# Patient Record
Sex: FEMALE | Race: WHITE | NOT HISPANIC OR LATINO | Employment: UNEMPLOYED | URBAN - METROPOLITAN AREA
[De-identification: names, ages, dates, MRNs, and addresses within clinical notes are randomized per-mention and may not be internally consistent; named-entity substitution may affect disease eponyms.]

---

## 2020-01-22 ENCOUNTER — TRANSFERRED RECORDS (OUTPATIENT)
Dept: MULTI SPECIALTY CLINIC | Facility: CLINIC | Age: 45
End: 2020-01-22

## 2020-01-22 LAB
HPV ABSTRACT: NORMAL
PAP-ABSTRACT: NORMAL

## 2022-07-05 ENCOUNTER — OFFICE VISIT (OUTPATIENT)
Dept: FAMILY MEDICINE | Facility: OTHER | Age: 47
End: 2022-07-05
Attending: FAMILY MEDICINE
Payer: COMMERCIAL

## 2022-07-05 VITALS
SYSTOLIC BLOOD PRESSURE: 112 MMHG | DIASTOLIC BLOOD PRESSURE: 78 MMHG | OXYGEN SATURATION: 98 % | TEMPERATURE: 97.6 F | RESPIRATION RATE: 18 BRPM | WEIGHT: 199 LBS | HEART RATE: 67 BPM | HEIGHT: 62 IN | BODY MASS INDEX: 36.62 KG/M2

## 2022-07-05 DIAGNOSIS — E03.9 HYPOTHYROIDISM, UNSPECIFIED TYPE: ICD-10-CM

## 2022-07-05 DIAGNOSIS — R19.4 CHANGE IN BOWEL HABITS: ICD-10-CM

## 2022-07-05 DIAGNOSIS — Z87.42 HISTORY OF MENORRHAGIA: ICD-10-CM

## 2022-07-05 DIAGNOSIS — E66.812 CLASS 2 OBESITY WITHOUT SERIOUS COMORBIDITY WITH BODY MASS INDEX (BMI) OF 36.0 TO 36.9 IN ADULT, UNSPECIFIED OBESITY TYPE: ICD-10-CM

## 2022-07-05 DIAGNOSIS — Z00.00 ENCOUNTER FOR MEDICAL EXAMINATION TO ESTABLISH CARE: Primary | ICD-10-CM

## 2022-07-05 DIAGNOSIS — R21 FACIAL RASH: ICD-10-CM

## 2022-07-05 DIAGNOSIS — N83.209 CYST OF OVARY, UNSPECIFIED LATERALITY: ICD-10-CM

## 2022-07-05 PROBLEM — N92.0 MENORRHAGIA: Status: ACTIVE | Noted: 2022-07-05

## 2022-07-05 LAB
ANION GAP SERPL CALCULATED.3IONS-SCNC: 7 MMOL/L (ref 3–14)
BUN SERPL-MCNC: 15 MG/DL (ref 7–25)
CALCIUM SERPL-MCNC: 9.5 MG/DL (ref 8.6–10.3)
CHLORIDE BLD-SCNC: 103 MMOL/L (ref 98–107)
CHOLEST SERPL-MCNC: 218 MG/DL
CO2 SERPL-SCNC: 27 MMOL/L (ref 21–31)
CREAT SERPL-MCNC: 0.74 MG/DL (ref 0.6–1.2)
FERRITIN SERPL-MCNC: 123 NG/ML (ref 24–336)
GFR SERPL CREATININE-BSD FRML MDRD: >90 ML/MIN/1.73M2
GLUCOSE BLD-MCNC: 90 MG/DL (ref 70–105)
HBA1C MFR BLD: 5.4 % (ref 4–6.2)
HDLC SERPL-MCNC: 67 MG/DL (ref 23–92)
LDLC SERPL CALC-MCNC: 118 MG/DL
NONHDLC SERPL-MCNC: 151 MG/DL
POTASSIUM BLD-SCNC: 4.1 MMOL/L (ref 3.5–5.1)
SODIUM SERPL-SCNC: 137 MMOL/L (ref 134–144)
T4 FREE SERPL-MCNC: 0.65 NG/DL (ref 0.6–1.6)
TRIGL SERPL-MCNC: 166 MG/DL
TSH SERPL DL<=0.005 MIU/L-ACNC: 4.19 MU/L (ref 0.4–4)

## 2022-07-05 PROCEDURE — 82728 ASSAY OF FERRITIN: CPT | Mod: ZL | Performed by: FAMILY MEDICINE

## 2022-07-05 PROCEDURE — 83036 HEMOGLOBIN GLYCOSYLATED A1C: CPT | Mod: ZL | Performed by: FAMILY MEDICINE

## 2022-07-05 PROCEDURE — 84403 ASSAY OF TOTAL TESTOSTERONE: CPT | Mod: ZL | Performed by: FAMILY MEDICINE

## 2022-07-05 PROCEDURE — 99204 OFFICE O/P NEW MOD 45 MIN: CPT | Performed by: FAMILY MEDICINE

## 2022-07-05 PROCEDURE — 86376 MICROSOMAL ANTIBODY EACH: CPT | Mod: ZL | Performed by: FAMILY MEDICINE

## 2022-07-05 PROCEDURE — 84439 ASSAY OF FREE THYROXINE: CPT | Mod: ZL | Performed by: FAMILY MEDICINE

## 2022-07-05 PROCEDURE — 86038 ANTINUCLEAR ANTIBODIES: CPT | Mod: ZL | Performed by: FAMILY MEDICINE

## 2022-07-05 PROCEDURE — 84270 ASSAY OF SEX HORMONE GLOBUL: CPT | Mod: ZL | Performed by: FAMILY MEDICINE

## 2022-07-05 PROCEDURE — 82627 DEHYDROEPIANDROSTERONE: CPT | Mod: ZL | Performed by: FAMILY MEDICINE

## 2022-07-05 PROCEDURE — 80061 LIPID PANEL: CPT | Mod: ZL | Performed by: FAMILY MEDICINE

## 2022-07-05 PROCEDURE — 80048 BASIC METABOLIC PNL TOTAL CA: CPT | Mod: ZL | Performed by: FAMILY MEDICINE

## 2022-07-05 PROCEDURE — 86800 THYROGLOBULIN ANTIBODY: CPT | Mod: ZL | Performed by: FAMILY MEDICINE

## 2022-07-05 PROCEDURE — 84443 ASSAY THYROID STIM HORMONE: CPT | Mod: ZL | Performed by: FAMILY MEDICINE

## 2022-07-05 PROCEDURE — 36415 COLL VENOUS BLD VENIPUNCTURE: CPT | Mod: ZL | Performed by: FAMILY MEDICINE

## 2022-07-05 RX ORDER — LEVOTHYROXINE SODIUM 25 UG/1
25 TABLET ORAL DAILY
Qty: 90 TABLET | Refills: 3 | Status: SHIPPED | OUTPATIENT
Start: 2022-07-05

## 2022-07-05 ASSESSMENT — ENCOUNTER SYMPTOMS: HEMATOCHEZIA: 1

## 2022-07-05 ASSESSMENT — PAIN SCALES - GENERAL: PAINLEVEL: NO PAIN (0)

## 2022-07-05 NOTE — NURSING NOTE
"Chief Complaint   Patient presents with     Rectal Bleeding     1 episode      Tailbone Pain     Hormones      Bowel Problems     Orange colored BM's      Establish Care     Patient is here to establish care and discuss numerous issues. A couple include: tailbone pain, orange colored BM's, 1 episode of blood in stool, and possible hormone issues.     Initial /78   Pulse 67   Temp 97.6  F (36.4  C) (Tympanic)   Resp 18   Ht 1.568 m (5' 1.75\")   Wt 90.3 kg (199 lb)   LMP 07/04/2022   SpO2 98%   Breastfeeding No   BMI 36.69 kg/m   Estimated body mass index is 36.69 kg/m  as calculated from the following:    Height as of this encounter: 1.568 m (5' 1.75\").    Weight as of this encounter: 90.3 kg (199 lb).  Medication Reconciliation: complete    Alix Eagle MA       FOOD SECURITY SCREENING QUESTIONS:    The next two questions are to help us understand your food security.  If you are feeling you need any assistance in this area, we have resources available to support you today.    Hunger Vital Signs:  Within the past 12 months we worried whether our food would run out before we got money to buy more. Never  Within the past 12 months the food we bought just didn't last and we didn't have money to get more. Never  Alix Eagle MA,LPN on 7/5/2022 at 9:10 AM      "

## 2022-07-05 NOTE — PROGRESS NOTES
Assessment & Plan       ICD-10-CM    1. Encounter for medical examination to establish care  Z00.00    2. Change in bowel habits  R19.4    3. Facial rash  R21 Anti Nuclear Yokasta IgG by IFA with Reflex     Anti Nuclear Yokasta IgG by IFA with Reflex   4. History of menorrhagia  Z87.42 Hemoglobin A1c     TSH     Hemoglobin A1c     TSH     Ferritin   5. Cyst of ovary, unspecified laterality  N83.209 Hemoglobin A1c     Basic Metabolic Panel     DHEA sulfate     Testosterone Free and Total     Hemoglobin A1c     Basic Metabolic Panel     DHEA sulfate     Testosterone Free and Total   6. Hypothyroidism, unspecified type  E03.9 Thyroid peroxidase antibody     Anti thyroglobulin antibody     levothyroxine (SYNTHROID/LEVOTHROID) 25 MCG tablet     T4, Free     T4, Free     Anti thyroglobulin antibody     Thyroid peroxidase antibody   7. Class 2 obesity without serious comorbidity with body mass index (BMI) of 36.0 to 36.9 in adult, unspecified obesity type  E66.9 Lipid Profile    Z68.36      1.  I have personally reviewed the labs listed below.  2.  Outside records from [facility] reviewed  3.  On labs that are back so far, patient has an elevated TSH.  I have added on additional thyroid studies.  Anticipate starting her on thyroid replacement, levothyroxine 25 mcg daily.  With starting this, she would need to have follow-up testing in about 6 weeks.  4.  Differential diagnosis of weight gain could be caloric, activity, metabolic changes related to thyroid disease.  5.  Change in bowel habits: Concerned that she did have an episode of rectal bleeding, bright red, painless.  This could be related to hemorrhoid, polyp and less likely would be colitis, fissure, malignancy.  Patient is age 46, referral to discuss colonoscopy versus anoscopy recommended.  6.  Patient with questions regarding PCOS.  More so, would be concern for dysmetabolic X syndrome.  7.  Differential diagnosis of facial rash could include rosacea,  "malar rash - ALTAF pending.  8.  Additional review of chart needed to update HCM, pap smear, mammogram   9. Diabetes screening is negative.      Review of external notes as documented elsewhere in note  Review of the result(s) of each unique test - labs  Ordering of each unique test  Prescription drug management         BMI:   Estimated body mass index is 36.69 kg/m  as calculated from the following:    Height as of this encounter: 1.568 m (5' 1.75\").    Weight as of this encounter: 90.3 kg (199 lb).   Weight management plan: Discussed healthy diet and exercise guidelines      No follow-ups on file.    SILVA RADFORD MD  [facility]    Valente Edward is a 46 year old, presenting for the following health issues:  Rectal Bleeding (1 episode ), Tailbone Pain, Hormones , Bowel Problems (Orange colored BM's ), and Establish Care      She's had a history of anemia, treatment for menorrhagia with ablation and D&C.  This was last November.      She's having pain with sitting - if she shifts with moving she feels like something is moving inside of her.    Was told at time of her ablation that she had cysts on her ovaries.    She is concerned about PCOS.  She doesn't think she's had diabetes testing done outside of pregnancy.  Wt gain has occurred.    She had a follow up hemoglobin done last week at [facility], was up to 14.    Her BMs are orange for almost two weeks.     For some time she's had more bowel urgency too.        +FH of thyroid disease.     Rectal Bleeding    History of Present Illness       Reason for visit:  Rectal discomfort and possible hormone issues  Symptom onset:  More than a month  Symptoms include:  Rectal discomfort  Symptom intensity:  Moderate  Symptom progression:  Worsening  Had these symptoms before:  No  What makes it worse:  Sitting    She eats 2-3 servings of fruits and vegetables daily.She consumes 0 sweetened beverage(s) daily.She exercises " "with enough effort to increase her heart rate 20 to 29 minutes per day.  She exercises with enough effort to increase her heart rate 5 days per week.   She is taking medications regularly.         Past Medical History:   Diagnosis Date     Concussion without loss of consciousness 2019     Iron deficiency anemia due to chronic blood loss      Menorrhagia      Past Surgical History:   Procedure Laterality Date     ENDOMETRIAL ABLATION  12/2021     The patient has a family history of stroke.        Review of Systems   Gastrointestinal: Positive for hematochezia.   Skin:        Persistent facial redness            Objective    /78   Pulse 67   Temp 97.6  F (36.4  C) (Tympanic)   Resp 18   Ht 1.568 m (5' 1.75\")   Wt 90.3 kg (199 lb)   LMP 07/04/2022   SpO2 98%   Breastfeeding No   BMI 36.69 kg/m    Body mass index is 36.69 kg/m .  Physical Exam   GENERAL: healthy, alert and no distress  RESP: lungs clear to auscultation - no rales, rhonchi or wheezes  CV: regular rates and rhythm, normal S1 S2, no S3 or S4, no murmur, click or rub, peripheral pulses strong and no peripheral edema  ABDOMEN: soft, nontender, without hepatosplenomegaly or masses and bowel sounds normal   (female): normal female external genitalia, normal urethral meatus  RECTAL (female): normal sphincter tone, no rectal masses - stool palpable    Results for orders placed or performed in visit on 07/05/22   Hemoglobin A1c     Status: Normal   Result Value Ref Range    Hemoglobin A1C 5.4 4.0 - 6.2 %   Basic Metabolic Panel     Status: Normal   Result Value Ref Range    Sodium 137 134 - 144 mmol/L    Potassium 4.1 3.5 - 5.1 mmol/L    Chloride 103 98 - 107 mmol/L    Carbon Dioxide (CO2) 27 21 - 31 mmol/L    Anion Gap 7 3 - 14 mmol/L    Urea Nitrogen 15 7 - 25 mg/dL    Creatinine 0.74 0.60 - 1.20 mg/dL    Calcium 9.5 8.6 - 10.3 mg/dL    Glucose 90 70 - 105 mg/dL    GFR Estimate >90 >60 mL/min/1.73m2   TSH     Status: Abnormal   Result Value Ref " Range    TSH 4.19 (H) 0.40 - 4.00 mU/L   Testosterone Free and Total     Status: None (In process)    Narrative    The following orders were created for panel order Testosterone Free and Total.  Procedure                               Abnormality         Status                     ---------                               -----------         ------                     Sex Hormone Binding Glob...[[id number]]                      In process                 Testosterone Free and Total[[id number]]                      In process                   Please view results for these tests on the individual orders.                   .  ..

## 2022-07-06 LAB
ANA SER QL IF: NEGATIVE
SHBG SERPL-SCNC: 68 NMOL/L (ref 30–135)

## 2022-07-07 LAB
DHEA-S SERPL-MCNC: 75 UG/DL (ref 35–430)
TESTOST FREE SERPL-MCNC: 0.25 NG/DL
TESTOST SERPL-MCNC: 23 NG/DL (ref 8–60)
THYROGLOB AB SERPL IA-ACNC: <20 IU/ML
THYROPEROXIDASE AB SERPL-ACNC: <10 IU/ML

## 2022-07-13 ENCOUNTER — TELEPHONE (OUTPATIENT)
Dept: FAMILY MEDICINE | Facility: OTHER | Age: 47
End: 2022-07-13

## 2022-07-13 DIAGNOSIS — R19.4 CHANGE IN BOWEL HABITS: ICD-10-CM

## 2022-07-13 DIAGNOSIS — K62.89 RECTAL PAIN: Primary | ICD-10-CM

## 2022-07-13 NOTE — TELEPHONE ENCOUNTER
Patient called and was put through the endoscopy scheduling line, patient has some questions as to the nature of the referral , will it be a consult and then a procedure etc. Please give patient a call.Thank You.Dayana Perez on 7/13/2022 at 2:35 PM

## 2022-07-13 NOTE — TELEPHONE ENCOUNTER
Left message to call back. Please let patient know our clinic does not have a GI department. GI referral will either be sent to [facility] or [facility]. [facility] provider travels to our clinic (not sure how often). [facility] provider travels to [place] (do not know when or how often).     Alix Eagle CMA on 7/13/2022 at 2:22 PM

## 2022-07-13 NOTE — TELEPHONE ENCOUNTER
Patient informed of below. She would like GI referral sent to [facility]. Please send referral to [facility].     Alix Eagle CMA on 7/13/2022 at 3:51 PM

## 2022-07-13 NOTE — TELEPHONE ENCOUNTER
Patient is wondering what was discussed at her last visit regarding her rectal discomfort.      Please call back   Thank you   Arti Sarmiento on 7/13/2022 at 12:36 PM

## 2022-07-13 NOTE — TELEPHONE ENCOUNTER
Patient was informed per OV note, it was recommended to see GI to discuss colonoscopy vs anoscopy. Patient would like to see GI. Referral pending.     Alix Eagle CMA on 7/13/2022 at 1:44 PM

## 2022-07-16 ENCOUNTER — HEALTH MAINTENANCE LETTER (OUTPATIENT)
Age: 47
End: 2022-07-16

## 2022-09-18 ENCOUNTER — HEALTH MAINTENANCE LETTER (OUTPATIENT)
Age: 47
End: 2022-09-18

## 2023-07-30 ENCOUNTER — HEALTH MAINTENANCE LETTER (OUTPATIENT)
Age: 48
End: 2023-07-30

## 2024-09-22 ENCOUNTER — HEALTH MAINTENANCE LETTER (OUTPATIENT)
Age: 49
End: 2024-09-22